# Patient Record
Sex: FEMALE | Race: WHITE | Employment: FULL TIME | URBAN - METROPOLITAN AREA
[De-identification: names, ages, dates, MRNs, and addresses within clinical notes are randomized per-mention and may not be internally consistent; named-entity substitution may affect disease eponyms.]

---

## 2023-10-22 ENCOUNTER — HOSPITAL ENCOUNTER (EMERGENCY)
Facility: HOSPITAL | Age: 63
Discharge: HOME | End: 2023-10-22
Attending: EMERGENCY MEDICINE
Payer: COMMERCIAL

## 2023-10-22 VITALS
HEIGHT: 60 IN | DIASTOLIC BLOOD PRESSURE: 82 MMHG | RESPIRATION RATE: 16 BRPM | BODY MASS INDEX: 29.45 KG/M2 | WEIGHT: 150 LBS | SYSTOLIC BLOOD PRESSURE: 141 MMHG | TEMPERATURE: 98 F | HEART RATE: 66 BPM | OXYGEN SATURATION: 96 %

## 2023-10-22 DIAGNOSIS — R59.1 LYMPHADENOPATHY: Primary | ICD-10-CM

## 2023-10-22 PROCEDURE — 99283 EMERGENCY DEPT VISIT LOW MDM: CPT

## 2023-10-22 PROCEDURE — 99284 EMERGENCY DEPT VISIT MOD MDM: CPT

## 2023-10-22 RX ORDER — ACETAMINOPHEN 500 MG
1000 TABLET ORAL ONCE
Status: COMPLETED | OUTPATIENT
Start: 2023-10-22 | End: 2023-10-22

## 2023-10-22 NOTE — ED INITIAL ASSESSMENT (HPI)
To ED for c/o pain. Patient states she had her left knee injected for arthritis on 10/17. The next day the patient was c/o abd pain and Friday night her head was hurting. Yesterday patient noticed \"knots\" behind bilateral ears and the back of her head. Patient rates her pain 8/10.

## 2023-10-22 NOTE — ED QUICK NOTES
PT safe to DC home per MD. Cali Lugo to dress self. DC teaching done, pt verbalizes understanding. Ambulatory with steady gait to exit.

## 2023-10-22 NOTE — DISCHARGE INSTRUCTIONS
Take 500 mg acetaminophen every 6 hours. Stay hydrated and get rest.    See primary care if not improving in the next several days. Return to the ER if you develop any emergent concerns.